# Patient Record
(demographics unavailable — no encounter records)

---

## 2024-11-04 NOTE — DISCUSSION/SUMMARY
[de-identified] : MIRIAM RODRIGEZ is a 73 year-old woman presenting for a NPV for a history of low back pain.   Prior treatment: Lumbar NOAH in 2019 w/ significant improvement of pain for years Physical therapy x6 weeks Acetaminophen prn   Plan: 1) X-ray lumbar spine images reviewed - patient with L3 compression fracture at the superior endplate ([facility])  2) Obtain x-ray right hip/pelvis, patient w/ moderate joint space narrowing in the right hip 3) Schedule CSI RIGHT hip; The procedure was explained to the patient in detail. Reviewed risks, benefits, and alternatives with the patient. Some risks discussed included temporary increase in pain, bleeding, infection, and side effects from steroids. The patient expressed understanding and would like to proceed. 4) Continue acetaminophen prn 5) RTC post procedure

## 2024-11-04 NOTE — PHYSICAL EXAM
[de-identified] : Gen: NAD Head: NC/AT Eyes: no glasses, no scleral icterus ENT: mucous membranes moist CV: No JVD Lungs: nonlabored breathing Abd: soft, NT/ND Ext: RLE: limited internal/external hip rotation, both of which provoke pain Back: +TTP in the right low lumbar facet region; +SLR on the right Neuro: CN intact LEs +5 L +5 R hip flexion +5 L +5 R leg extension +5 L +5 R leg flexion +5 L +5 R foot dorsiflexion +5 L +5 R foot plantarflexion +5 L +5 R EHL extension Psych: normal affect Skin: no visible lesions

## 2024-11-04 NOTE — HISTORY OF PRESENT ILLNESS
[Lower back] : lower back [Buttock] : buttock [Right Leg] : right leg [9] : 9 [2] : 2 [Dull/Aching] : dull/aching [Radiating] : radiating [Sharp] : sharp [Stabbing] : stabbing [Tightness] : tightness [Intermittent] : intermittent [Household chores] : household chores [Leisure] : leisure [Meds] : meds [Sitting] : sitting [Walking] : walking [] : no [FreeTextEntry1] : HENRY mendez  [FreeTextEntry7] : R leg  [FreeTextEntry9] : over the counter pain medications.  [de-identified] : chiropractor did not help

## 2024-11-04 NOTE — DATA REVIEWED
[Outside X-rays] : outside x-rays [Lumbar Spine] : lumbar spine [Report was reviewed and noted in the chart] : The report was reviewed and noted in the chart [I independently reviewed and interpreted images and report] : I independently reviewed and interpreted images and report [FreeTextEntry1] : 10/4/2024 X-ray lumbar ([facility]) Anterior wedging of the L3 vertebral body, compatible w/ age indeterminate compression fracture. Advance intervertebral disc space narrowing at L4-L5. Multilevel vertebral endplate osteophytes present, moderate facet hypertrophy of the low lumbar spine.

## 2024-11-04 NOTE — PHYSICAL EXAM
[de-identified] : Gen: NAD Head: NC/AT Eyes: no glasses, no scleral icterus ENT: mucous membranes moist CV: No JVD Lungs: nonlabored breathing Abd: soft, NT/ND Ext: RLE: limited internal/external hip rotation, both of which provoke pain Back: +TTP in the right low lumbar facet region; +SLR on the right Neuro: CN intact LEs +5 L +5 R hip flexion +5 L +5 R leg extension +5 L +5 R leg flexion +5 L +5 R foot dorsiflexion +5 L +5 R foot plantarflexion +5 L +5 R EHL extension Psych: normal affect Skin: no visible lesions

## 2024-11-04 NOTE — HISTORY OF PRESENT ILLNESS
[Lower back] : lower back [Buttock] : buttock [Right Leg] : right leg [9] : 9 [2] : 2 [Dull/Aching] : dull/aching [Radiating] : radiating [Sharp] : sharp [Stabbing] : stabbing [Tightness] : tightness [Intermittent] : intermittent [Household chores] : household chores [Leisure] : leisure [Meds] : meds [Sitting] : sitting [Walking] : walking [] : no [FreeTextEntry1] : HENRY mendez  [FreeTextEntry7] : R leg  [FreeTextEntry9] : over the counter pain medications.  [de-identified] : chiropractor did not help

## 2024-11-04 NOTE — DISCUSSION/SUMMARY
[de-identified] : MIRIAM RODRIGEZ is a 73 year-old woman presenting for a NPV for a history of low back pain.   Prior treatment: Lumbar NOAH in 2019 w/ significant improvement of pain for years Physical therapy x6 weeks Acetaminophen prn   Plan: 1) X-ray lumbar spine images reviewed - patient with L3 compression fracture at the superior endplate ([facility])  2) Obtain x-ray right hip/pelvis, patient w/ moderate joint space narrowing in the right hip 3) Schedule CSI RIGHT hip; The procedure was explained to the patient in detail. Reviewed risks, benefits, and alternatives with the patient. Some risks discussed included temporary increase in pain, bleeding, infection, and side effects from steroids. The patient expressed understanding and would like to proceed. 4) Continue acetaminophen prn 5) RTC post procedure

## 2024-11-15 NOTE — PROCEDURE
[FreeTextEntry1] : RIGHT intra-articular hip injection [FreeTextEntry2] : RIGHT hip osteoarthritis [FreeTextEntry3] : Procedure Date: 11/15/2024   Preoperative Diagnosis: chronic right hip pain   Procedure: right intra-articular hip injection under fluoroscopic guidance   Anesthesia: local   Complications: none   EBL: none   Procedure in detail: Patient was seen and examined. Risks, benefits, and alternatives for the procedure were discussed with the patient in detail. The patient gave written and verbal consent for the procedure. Patient laid down in the supine position. Skin overlying the RIGHT groin was prepped with chloraprep and draped in the usual sterile fashion. Under AP fluoroscopic view, the femoral head was identified. Target for the procedure was the RIGHT femoral head near the junction of the femoral neck. A 22 gauge, 3.5 inch spinal needle was inserted and advanced under intermittent fluoroscopic guidance until contact was made with os. After negative aspiration, a total of 4ml of injectate, which consisted of 3ml of 0.25% bupivacaine and 1ml of 80mg/ml depomedrol was injected. The needle was restyletted and withdrawn. No apparent bleeding. Band aid was placed. Patient tolerated the procedure well.

## 2024-12-03 NOTE — PHYSICAL EXAM
[de-identified] : Gen: NAD Head: NC/AT Eyes: no glasses, no scleral icterus ENT: mucous membranes moist CV: No JVD Lungs: nonlabored breathing Abd: soft, NT/ND Ext: RLE: limited internal/external hip rotation, both of which provoke pain Back: +TTP in the right low lumbar facet region; +SLR on the right Neuro: CN intact LEs +5 L +5 R hip flexion +5 L +5 R leg extension +5 L +5 R leg flexion +5 L +5 R foot dorsiflexion +5 L +5 R foot plantarflexion +5 L +5 R EHL extension Psych: normal affect Skin: no visible lesions

## 2024-12-03 NOTE — HISTORY OF PRESENT ILLNESS
[Lower back] : lower back [Buttock] : buttock [Right Leg] : right leg [9] : 9 [Dull/Aching] : dull/aching [Radiating] : radiating [Sharp] : sharp [Stabbing] : stabbing [Tightness] : tightness [Intermittent] : intermittent [Household chores] : household chores [Leisure] : leisure [Meds] : meds [Sitting] : sitting [Walking] : walking [2] : 2 [0] : 0 [] : no [FreeTextEntry1] : HENRY mendez  [FreeTextEntry7] : R leg  [FreeTextEntry9] : over the counter pain medications.  [de-identified] : chiropractor did not help [TWNoteComboBox1] : 80%

## 2024-12-03 NOTE — DISCUSSION/SUMMARY
[de-identified] : MIRIAM RODRIGEZ is a 73 year-old woman presenting for a RPV for a history of low back pain.   Prior treatment: 11/15/2024 CSI RIGHT HIP w/o MAC w/ 80% improvement of pain Lumbar NOAH in 2019 w/ significant improvement of pain for years Physical therapy x6 weeks Acetaminophen prn   Plan: 1) X-ray lumbar spine images reviewed - patient with L3 compression fracture at the superior endplate ([facility])  2) X-ray hip reviewed with the patient patient w/ moderate joint space narrowing in the right hip 3) Consider repeat CSI RIGHT hip in the future 4) Continue acetaminophen prn 5) RTC 3 months

## 2024-12-03 NOTE — PHYSICAL EXAM
[de-identified] : Gen: NAD Head: NC/AT Eyes: no glasses, no scleral icterus ENT: mucous membranes moist CV: No JVD Lungs: nonlabored breathing Abd: soft, NT/ND Ext: RLE: limited internal/external hip rotation, both of which provoke pain Back: +TTP in the right low lumbar facet region; +SLR on the right Neuro: CN intact LEs +5 L +5 R hip flexion +5 L +5 R leg extension +5 L +5 R leg flexion +5 L +5 R foot dorsiflexion +5 L +5 R foot plantarflexion +5 L +5 R EHL extension Psych: normal affect Skin: no visible lesions

## 2024-12-03 NOTE — HISTORY OF PRESENT ILLNESS
[Lower back] : lower back [Buttock] : buttock [Right Leg] : right leg [9] : 9 [Dull/Aching] : dull/aching [Radiating] : radiating [Sharp] : sharp [Stabbing] : stabbing [Tightness] : tightness [Intermittent] : intermittent [Household chores] : household chores [Leisure] : leisure [Meds] : meds [Sitting] : sitting [Walking] : walking [2] : 2 [0] : 0 [] : no [FreeTextEntry1] : HENRY mendez  [FreeTextEntry7] : R leg  [FreeTextEntry9] : over the counter pain medications.  [de-identified] : chiropractor did not help [TWNoteComboBox1] : 80%

## 2024-12-03 NOTE — DISCUSSION/SUMMARY
[de-identified] : MIRIAM RODRIGEZ is a 73 year-old woman presenting for a RPV for a history of low back pain.   Prior treatment: 11/15/2024 CSI RIGHT HIP w/o MAC w/ 80% improvement of pain Lumbar NOAH in 2019 w/ significant improvement of pain for years Physical therapy x6 weeks Acetaminophen prn   Plan: 1) X-ray lumbar spine images reviewed - patient with L3 compression fracture at the superior endplate ([facility])  2) X-ray hip reviewed with the patient patient w/ moderate joint space narrowing in the right hip 3) Consider repeat CSI RIGHT hip in the future 4) Continue acetaminophen prn 5) RTC 3 months

## 2025-03-24 NOTE — DISCUSSION/SUMMARY
[de-identified] : MIRIAM RODRIGEZ is a 73 year-old woman presenting for a RPV for a history of low back pain.   Prior treatment: 11/15/2024 CSI RIGHT HIP w/o MAC w/ 80% improvement of pain Lumbar NOAH in 2019 w/ significant improvement of pain for years Physical therapy x6 weeks Acetaminophen prn   Plan: 1) X-ray lumbar spine images reviewed - patient with L3 compression fracture at the superior endplate ([facility])  2) X-ray hip reviewed with the patient patient w/ moderate joint space narrowing in the right hip 3) Referral to ortho - joint for consideration of right MING 4) Continue acetaminophen prn 5) RTC prn

## 2025-03-24 NOTE — HISTORY OF PRESENT ILLNESS
[Lower back] : lower back [Buttock] : buttock [Right Leg] : right leg [2] : 2 [0] : 0 [Dull/Aching] : dull/aching [Radiating] : radiating [Sharp] : sharp [Stabbing] : stabbing [Tightness] : tightness [Intermittent] : intermittent [Household chores] : household chores [Leisure] : leisure [Meds] : meds [Sitting] : sitting [Walking] : walking [4] : 4 [3] : 3 [] : no [FreeTextEntry1] : HENRY mendez  [FreeTextEntry7] : R leg  [FreeTextEntry9] : over the counter pain medications.  [de-identified] : chiropractor did not help [TWNoteComboBox1] : 80%

## 2025-03-24 NOTE — HISTORY OF PRESENT ILLNESS
[Lower back] : lower back [Buttock] : buttock [Right Leg] : right leg [2] : 2 [0] : 0 [Dull/Aching] : dull/aching [Radiating] : radiating [Sharp] : sharp [Stabbing] : stabbing [Tightness] : tightness [Intermittent] : intermittent [Household chores] : household chores [Leisure] : leisure [Meds] : meds [Sitting] : sitting [Walking] : walking [4] : 4 [3] : 3 [] : no [FreeTextEntry1] : HENRY mendez  [FreeTextEntry7] : R leg  [FreeTextEntry9] : over the counter pain medications.  [de-identified] : chiropractor did not help [TWNoteComboBox1] : 80%

## 2025-03-24 NOTE — DISCUSSION/SUMMARY
[de-identified] : MIRIAM RODRIGEZ is a 73 year-old woman presenting for a RPV for a history of low back pain.   Prior treatment: 11/15/2024 CSI RIGHT HIP w/o MAC w/ 80% improvement of pain Lumbar NOAH in 2019 w/ significant improvement of pain for years Physical therapy x6 weeks Acetaminophen prn   Plan: 1) X-ray lumbar spine images reviewed - patient with L3 compression fracture at the superior endplate ([facility])  2) X-ray hip reviewed with the patient patient w/ moderate joint space narrowing in the right hip 3) Referral to ortho - joint for consideration of right MING 4) Continue acetaminophen prn 5) RTC prn

## 2025-03-24 NOTE — PHYSICAL EXAM
[de-identified] : Gen: NAD Head: NC/AT Eyes: no glasses, no scleral icterus ENT: mucous membranes moist CV: No JVD Lungs: nonlabored breathing Abd: soft, NT/ND Ext: RLE: limited internal/external hip rotation, both of which provoke pain Back: +TTP in the right low lumbar facet region; +SLR on the right Neuro: CN intact LEs +5 L +5 R hip flexion +5 L +5 R leg extension +5 L +5 R leg flexion +5 L +5 R foot dorsiflexion +5 L +5 R foot plantarflexion +5 L +5 R EHL extension Psych: normal affect Skin: no visible lesions

## 2025-03-24 NOTE — PHYSICAL EXAM
[de-identified] : Gen: NAD Head: NC/AT Eyes: no glasses, no scleral icterus ENT: mucous membranes moist CV: No JVD Lungs: nonlabored breathing Abd: soft, NT/ND Ext: RLE: limited internal/external hip rotation, both of which provoke pain Back: +TTP in the right low lumbar facet region; +SLR on the right Neuro: CN intact LEs +5 L +5 R hip flexion +5 L +5 R leg extension +5 L +5 R leg flexion +5 L +5 R foot dorsiflexion +5 L +5 R foot plantarflexion +5 L +5 R EHL extension Psych: normal affect Skin: no visible lesions

## 2025-04-01 NOTE — ASSESSMENT
[FreeTextEntry1] : 73 year F with right hip OA, CSI of right hip with little relief.  IImtiaz M.D. discussed the patients diagnosis and treatment options, non-surgical and surgical, with the patient and/or legal guardian including the potential benefits and complications of each. Potential complications of non-surgical treatments discussed include residual pain and/or disability, non-healing, progression of symptoms and/or disease. Potential complications of surgery discussed include infection, nerve injury, vascular injury, cartilage injury, ligament injury, tendon injury, muscle injury, skin injury, stiffness, instability, weakness, persistent pain, technical or hardware failure, need for additional surgery, re-injury, medical complication, anesthetic related complication, and/or death. All questions were answered. The patient and/or legal guardian has elected to proceed with surgery. Informed consent obtained for Left MING                     Preoperative evaluation and testing as needed is advised within 30 days prior to the procedure.   Time Based billin minutes was spent with the patient today taking the patient's history, conducting a physical examination, reviewing imaging studies, and  detailed discussion regarding the diagnosis and treatment plan.

## 2025-04-01 NOTE — IMAGING
[de-identified] : Constitutional: well developed and well nourished, able to communicate Cardiovascular: Peripheral vascular exam is grossly normal Neurologic: Alert and oriented, no acute distress. Skin: normal skin with no ulcers, rashes, or lesions Pulmonary: No respiratory distress, breathing comfortably on room air Lymphatics: No obvious lymphadenopathy or lymphedema in areas examined  LOWER EXTREMITY/ RIGHT HIP EXAM Standing pelvic alignment: Symmetric with no Trendelenburg Atrophy: none Ecchymosis/swelling: none  Range of Motion Hip: Flexion/extension/ER/IR     / EX 20/ ER 45/ IR 10/ AB 60 /AD 20 Impingement with flexion adduction and internal rotation: POS Contracture: none Snapping hip: negative Greater trochanter: no tenderness  Neurovascular Distal extremities: warm to touch Sensation to light touch: intact Muscle strength: 5/5  Back Alignment: normal Spinous process: no tenderness Paraspinal tenderness: No tenderness Range of motion: full and pain free Scoliosis: none Straight leg sign: negative  [id number] Xrays of the Right hip 3v were taken demonstrating tonnis grade 3 Osteoarthritis with joint space collapse, sclerosis, osteophytes, and subchondral cysts

## 2025-04-01 NOTE — HISTORY OF PRESENT ILLNESS
[de-identified] : 04/01/2025 WILIAN RODRIGEZ 73 year female Here for evaluation of right hip pain. patient has been having pain in right hip since 6/2024. denies any injury, has been seen by DR Calloway. states that the pain is progressively getting worse. pain describes as a constant sharp pain in the groin. pain radiates to thigh, has numbness sometimes. Last hip CSI worked for a couple days.  + NSAIDs

## 2025-05-14 NOTE — DATA REVIEWED
[Outside X-rays] : outside x-rays [Lumbar Spine] : lumbar spine [Report was reviewed and noted in the chart] : The report was reviewed and noted in the chart [I independently reviewed and interpreted images and report] : I independently reviewed and interpreted images and report [FreeTextEntry1] : 10/4/2024 X-ray lumbar ([facility]) Anterior wedging of the L3 vertebral body, compatible w/ age indeterminate compression fracture. Advance intervertebral disc space narrowing at L4-L5. Multilevel vertebral endplate osteophytes present, moderate facet hypertrophy of the low lumbar spine.  no

## 2025-07-21 NOTE — HISTORY OF PRESENT ILLNESS
[de-identified] : 04/01/2025 WILIAN RODRIGEZ 73 year female Here for evaluation of right hip pain. patient has been having pain in right hip since 6/2024. denies any injury, has been seen by DR Calloway. states that the pain is progressively getting worse. pain describes as a constant sharp pain in the groin. pain radiates to thigh, has numbness sometimes. Last hip CSI worked for a couple days.  + NSAIDs  07/21/2025: patient is here today for pov#1. she notes having little to no pain. 1/10. Ambulating unassisted.

## 2025-07-21 NOTE — IMAGING
[de-identified] : Constitutional: well developed and well nourished, able to communicate Cardiovascular: Peripheral vascular exam is grossly normal Neurologic: Alert and oriented, no acute distress. Skin: normal skin with no ulcers, rashes, or lesions Pulmonary: No respiratory distress, breathing comfortably on room air Lymphatics: No obvious lymphadenopathy or lymphedema in areas examined  LOWER EXTREMITY/ RIGHT HIP EXAM Standing pelvic alignment: Symmetric with no Trendelenburg Atrophy: none Ecchymosis/swelling: none  Range of Motion Hip: Flexion/extension/ER/IR     / EX 20/ ER 45/ IR 10/ AB 60 /AD 20 Impingement with flexion adduction and internal rotation: POS Contracture: none Snapping hip: negative Greater trochanter: no tenderness  Neurovascular Distal extremities: warm to touch Sensation to light touch: intact Muscle strength: 5/5  Back Alignment: normal Spinous process: no tenderness Paraspinal tenderness: No tenderness Range of motion: full and pain free Scoliosis: none Straight leg sign: negative  [id number] Xrays of the Right hip 3v were taken demonstrating tonnis grade 3 Osteoarthritis with joint space collapse, sclerosis, osteophytes, and subchondral cysts

## 2025-07-21 NOTE — ASSESSMENT
[FreeTextEntry1] : 73 year F with right hip OA, CSI of right hip with little relief.  Imtiaz GREENWOOD M.D. discussed the patients diagnosis and treatment options, non-surgical and surgical, with the patient and/or legal guardian including the potential benefits and complications of each. Potential complications of non-surgical treatments discussed include residual pain and/or disability, non-healing, progression of symptoms and/or disease. Potential complications of surgery discussed include infection, nerve injury, vascular injury, cartilage injury, ligament injury, tendon injury, muscle injury, skin injury, stiffness, instability, weakness, persistent pain, technical or hardware failure, need for additional surgery, re-injury, medical complication, anesthetic related complication, and/or death. All questions were answered. The patient and/or legal guardian has elected to proceed with surgery. Informed consent obtained for Left MING                     Preoperative evaluation and testing as needed is advised within 30 days prior to the procedure.   07/21/2025: 1st post op s/p R MING Transition to outpatient PT  ASA BID X 2 weeks Follow up one month

## 2025-07-21 NOTE — PHYSICAL EXAM
[de-identified] : POSTOP RIGHT HIP EXAM Edema: mild well healing surgical incision without surrounding erythema/drainage  ROM 0-90 degrees of flexion No pain with IR/ER ROM  Neurovascular exam Motor function 5/5 distal lower extremity Sensation to light touch: intact Distal pulses: 2+  XR of the R hip today demonstrate MING in place w/o signs of interval changes from postoperative XR